# Patient Record
Sex: FEMALE | NOT HISPANIC OR LATINO | Employment: UNEMPLOYED | URBAN - METROPOLITAN AREA
[De-identification: names, ages, dates, MRNs, and addresses within clinical notes are randomized per-mention and may not be internally consistent; named-entity substitution may affect disease eponyms.]

---

## 2017-07-10 ENCOUNTER — APPOINTMENT (OUTPATIENT)
Dept: AUDIOLOGY | Age: 22
End: 2017-07-10
Payer: COMMERCIAL

## 2017-07-10 PROCEDURE — 92595 HB ELECTRO HEARNG AID TST BOTH: CPT | Performed by: AUDIOLOGIST

## 2017-07-10 PROCEDURE — V5160 DISPENSING FEE BINAURAL: HCPCS | Performed by: AUDIOLOGIST

## 2017-07-10 PROCEDURE — V5264 EAR MOLD/INSERT: HCPCS | Performed by: AUDIOLOGIST

## 2017-07-10 PROCEDURE — V5261 HEARING AID, DIGIT, BIN, BTE: HCPCS | Performed by: AUDIOLOGIST

## 2017-07-10 PROCEDURE — V5266 BATTERY FOR HEARING DEVICE: HCPCS | Performed by: AUDIOLOGIST

## 2017-07-10 PROCEDURE — 92593 HB HEARING AID CHECK BOTH EARS: CPT | Performed by: AUDIOLOGIST

## 2019-09-23 ENCOUNTER — DOCUMENTATION (OUTPATIENT)
Dept: AUDIOLOGY | Age: 24
End: 2019-09-23

## 2019-09-23 NOTE — PROGRESS NOTES
Progress Note    Name:  Georgie Newberry  :  1995  Age:  22 y o    Date of Evaluation: 19     Scanned documentation       Ashlee Carranza   Clinical Audiologist

## 2019-10-01 ENCOUNTER — DOCUMENTATION (OUTPATIENT)
Dept: AUDIOLOGY | Age: 24
End: 2019-10-01

## 2019-10-01 NOTE — PROGRESS NOTES
Progress Note    Name:  Piero Lama  :  1995  Age:  22 y o    Date of Evaluation: 10/01/19     Scanned in HA chart part 2      Ashlee Rivas D , [id number] [email or web address]  Clinical Audiologist